# Patient Record
Sex: MALE | URBAN - METROPOLITAN AREA
[De-identification: names, ages, dates, MRNs, and addresses within clinical notes are randomized per-mention and may not be internally consistent; named-entity substitution may affect disease eponyms.]

---

## 2024-11-19 ENCOUNTER — EMERGENCY (EMERGENCY)
Facility: HOSPITAL | Age: 24
LOS: 1 days | Discharge: HOME | End: 2024-11-19
Attending: EMERGENCY MEDICINE | Admitting: EMERGENCY MEDICINE
Payer: SELF-PAY

## 2024-11-19 VITALS
DIASTOLIC BLOOD PRESSURE: 81 MMHG | RESPIRATION RATE: 16 BRPM | TEMPERATURE: 98 F | SYSTOLIC BLOOD PRESSURE: 175 MMHG | HEART RATE: 151 BPM | OXYGEN SATURATION: 99 %

## 2024-11-19 LAB
ALBUMIN SERPL ELPH-MCNC: 4.1 G/DL — SIGNIFICANT CHANGE UP (ref 3.4–5)
ALP SERPL-CCNC: 80 U/L — SIGNIFICANT CHANGE UP (ref 40–120)
ALT FLD-CCNC: 16 U/L — SIGNIFICANT CHANGE UP (ref 12–42)
ANION GAP SERPL CALC-SCNC: 10 MMOL/L — SIGNIFICANT CHANGE UP (ref 9–16)
AST SERPL-CCNC: 21 U/L — SIGNIFICANT CHANGE UP (ref 15–37)
BILIRUB SERPL-MCNC: 0.5 MG/DL — SIGNIFICANT CHANGE UP (ref 0.2–1.2)
BUN SERPL-MCNC: 11 MG/DL — SIGNIFICANT CHANGE UP (ref 7–23)
CALCIUM SERPL-MCNC: 9.2 MG/DL — SIGNIFICANT CHANGE UP (ref 8.5–10.5)
CHLORIDE SERPL-SCNC: 104 MMOL/L — SIGNIFICANT CHANGE UP (ref 96–108)
CO2 SERPL-SCNC: 26 MMOL/L — SIGNIFICANT CHANGE UP (ref 22–31)
CREAT SERPL-MCNC: 1.21 MG/DL — SIGNIFICANT CHANGE UP (ref 0.5–1.3)
EGFR: 85 ML/MIN/1.73M2 — SIGNIFICANT CHANGE UP
GLUCOSE SERPL-MCNC: 209 MG/DL — HIGH (ref 70–99)
HCT VFR BLD CALC: 44.5 % — SIGNIFICANT CHANGE UP (ref 39–50)
HGB BLD-MCNC: 15.4 G/DL — SIGNIFICANT CHANGE UP (ref 13–17)
MCHC RBC-ENTMCNC: 31.8 PG — SIGNIFICANT CHANGE UP (ref 27–34)
MCHC RBC-ENTMCNC: 34.6 G/DL — SIGNIFICANT CHANGE UP (ref 32–36)
MCV RBC AUTO: 91.9 FL — SIGNIFICANT CHANGE UP (ref 80–100)
NRBC # BLD: 0 /100 WBCS — SIGNIFICANT CHANGE UP (ref 0–0)
PLATELET # BLD AUTO: 211 K/UL — SIGNIFICANT CHANGE UP (ref 150–400)
POTASSIUM SERPL-MCNC: 3.4 MMOL/L — LOW (ref 3.5–5.3)
POTASSIUM SERPL-SCNC: 3.4 MMOL/L — LOW (ref 3.5–5.3)
PROT SERPL-MCNC: 7.8 G/DL — SIGNIFICANT CHANGE UP (ref 6.4–8.2)
RBC # BLD: 4.84 M/UL — SIGNIFICANT CHANGE UP (ref 4.2–5.8)
RBC # FLD: 12 % — SIGNIFICANT CHANGE UP (ref 10.3–14.5)
SODIUM SERPL-SCNC: 140 MMOL/L — SIGNIFICANT CHANGE UP (ref 132–145)
WBC # BLD: 10.41 K/UL — SIGNIFICANT CHANGE UP (ref 3.8–10.5)
WBC # FLD AUTO: 10.41 K/UL — SIGNIFICANT CHANGE UP (ref 3.8–10.5)

## 2024-11-19 PROCEDURE — 99291 CRITICAL CARE FIRST HOUR: CPT

## 2024-11-19 PROCEDURE — 99053 MED SERV 10PM-8AM 24 HR FAC: CPT

## 2024-11-19 RX ORDER — LORAZEPAM 2 MG
1 TABLET ORAL ONCE
Refills: 0 | Status: DISCONTINUED | OUTPATIENT
Start: 2024-11-19 | End: 2024-11-19

## 2024-11-19 RX ORDER — SODIUM CHLORIDE 9 MG/ML
1000 INJECTION, SOLUTION INTRAMUSCULAR; INTRAVENOUS; SUBCUTANEOUS ONCE
Refills: 0 | Status: COMPLETED | OUTPATIENT
Start: 2024-11-19 | End: 2024-11-19

## 2024-11-19 RX ADMIN — Medication 1 MILLIGRAM(S): at 23:22

## 2024-11-19 RX ADMIN — SODIUM CHLORIDE 1000 MILLILITER(S): 9 INJECTION, SOLUTION INTRAMUSCULAR; INTRAVENOUS; SUBCUTANEOUS at 23:20

## 2024-11-19 NOTE — ED PROVIDER NOTE - PHYSICAL EXAMINATION
Constitutional:  Anxious, appears under the influence  HEENT: Airway patent, Nasal mucosa clear. Dry mucus membranes  Eyes: Injected, pupils are dilated  Cardiac: Normal rate, regular rhythm.  Respiratory: Breath sounds clear and equal bilaterally.  GI: Abdomen soft, non-tender, no guarding.  MSK: Spine appears normal, range of motion is not limited, no muscle or joint tenderness  Neuro: Alert and oriented, no focal deficits, no motor or sensory deficits.  Skin: Skin normal color for race, warm, dry and intact. No evidence of rash.

## 2024-11-19 NOTE — ED ADULT NURSE NOTE - OBJECTIVE STATEMENT
Patient states took unknown pill and now feels face is deformed. Pt is awake and alert. Tachycardiac on the monitor. no acute distress at present time. pt speaking with family on the phone.

## 2024-11-19 NOTE — ED PROVIDER NOTE - MDM ORDERS SUBMITTED SELECTION
An ETCO2 monitor was placed on the pt with 3LPM bled in. The Ambu bag, suction, and airways were setup and present in the room, but not needed. Pt was able to maintain airway throughout the procedure with no intervention needed. ETCO2 levels were maintained between 37-33    Mary Grace Rust, RT        Medications

## 2024-11-19 NOTE — ED ADULT TRIAGE NOTE - CHIEF COMPLAINT QUOTE
Pt BIBA c/o AMS. Pt states took an unknown pink pill from someone and began feeling "my face is deforming" and increase anxiety. Noted to be tachycardic in triage. Cryotherapy Text: The wound bed was treated with cryotherapy after the biopsy was performed.

## 2024-11-19 NOTE — ED ADULT NURSE NOTE - CHIEF COMPLAINT QUOTE
Pt BIBA c/o AMS. Pt states took an unknown pink pill from someone and began feeling "my face is deforming" and increase anxiety. Noted to be tachycardic in triage. Cephalexin Counseling: I counseled the patient regarding use of cephalexin as an antibiotic for prophylactic and/or therapeutic purposes. Cephalexin (commonly prescribed under brand name Keflex) is a cephalosporin antibiotic which is active against numerous classes of bacteria, including most skin bacteria. Side effects may include nausea, diarrhea, gastrointestinal upset, rash, hives, yeast infections, and in rare cases, hepatitis, kidney disease, seizures, fever, confusion, neurologic symptoms, and others. Patients with severe allergies to penicillin medications are cautioned that there is about a 10% incidence of cross-reactivity with cephalosporins. When possible, patients with penicillin allergies should use alternatives to cephalosporins for antibiotic therapy.

## 2024-11-19 NOTE — ED PROVIDER NOTE - OBJECTIVE STATEMENT
24-year-old male with no past medical history presenting to the emergency room with side effects of drug abuse.  Patient states that he was forced to take a pill that was pink approximate 30 minutes to an hour prior to arrival, started to feel like something was crawling on his face, states that he is feeling full body numbness as well.  Denies chest pain or shortness of breath.  Denies taking any other drugs or alcohol.  Denies any medical history

## 2024-11-19 NOTE — ED PROVIDER NOTE - CLINICAL SUMMARY MEDICAL DECISION MAKING FREE TEXT BOX
A/P: 24-year-old male with no past medical history presenting to the emergency room with side effects of drug abuse.  Patient states that he was forced to take a pill that was pink approximate 30 minutes to an hour prior to arrival, started to feel like something was crawling on his face, states that he is feeling full body numbness as well.  Denies chest pain or shortness of breath.  Denies taking any other drugs or alcohol.  Denies any medical history  --Patient is experiencing side effects of is likely experiencing the side effects of amphetamines.  -- Plan to check lab work to rule out electrolyte imbalance, EKG to rule out arrhythmia, and will treat with fluids, Ativan and continue to monitor

## 2024-11-19 NOTE — ED PROVIDER NOTE - CRITICAL CARE ATTENDING CONTRIBUTION TO CARE
The patient was seen immediately upon arrival due to a high probability of imminent or life-threatening deterioration secondary to drug abuse with extreme tachycardia, which required my direct attention, intervention, and personal management at the bedside. I have personally provided critical care time exclusive of time spent on separately billable procedures. Time includes review of laboratory data, radiology results, discussion with consultants, discussion with the patient's family, and monitoring for potential decompensation.

## 2024-11-20 VITALS
DIASTOLIC BLOOD PRESSURE: 67 MMHG | RESPIRATION RATE: 18 BRPM | OXYGEN SATURATION: 98 % | HEART RATE: 89 BPM | SYSTOLIC BLOOD PRESSURE: 132 MMHG

## 2024-11-20 LAB — PCP SPEC-MCNC: SIGNIFICANT CHANGE UP

## 2024-11-20 PROCEDURE — 99238 HOSP IP/OBS DSCHRG MGMT 30/<: CPT

## 2024-11-20 RX ORDER — SODIUM CHLORIDE 9 MG/ML
1000 INJECTION, SOLUTION INTRAMUSCULAR; INTRAVENOUS; SUBCUTANEOUS ONCE
Refills: 0 | Status: COMPLETED | OUTPATIENT
Start: 2024-11-20 | End: 2024-11-20

## 2024-11-20 RX ADMIN — SODIUM CHLORIDE 1000 MILLILITER(S): 9 INJECTION, SOLUTION INTRAMUSCULAR; INTRAVENOUS; SUBCUTANEOUS at 00:18

## 2024-11-20 RX ADMIN — SODIUM CHLORIDE 1000 MILLILITER(S): 9 INJECTION, SOLUTION INTRAMUSCULAR; INTRAVENOUS; SUBCUTANEOUS at 00:33

## 2024-11-20 NOTE — ED CDU PROVIDER DISPOSITION NOTE - NSFOLLOWUPINSTRUCTIONS_ED_ALL_ED_FT
Methamphetamine Tablets  ¿Qué es elysia medicamento?  La METANFETAMINA se usa para tratar el trastorno del déficit de atención con hiperactividad (TDAH). Actúa mejorando la concentración y reduciendo el comportamiento impulsivo. Pertenece a un herbert de medicamentos llamados estimulantes.    Elysia medicamento puede ser utilizado para otros usos; si tiene alguna pregunta consulte con dwyer proveedor de atención médica o con dwyer farmacéutico.    MARCAS COMUNES: Desoxyn    ¿Qué le farhana informar a mi profesional de la mars antes de tammy elysia medicamento?  Necesitan saber si usted presenta alguno de los siguientes problemas o situaciones:    Ansiedad o ataques de pánico  Problemas de circulación en los dedos de las irma y de los pies (enfermedad de Raynaud)  Glaucoma  Endurecimiento o bloqueos de las arterias o los vasos sanguíneos del corazón  Enfermedad cardiaca o un defecto del corazón  Presión arterial sean  Antecedentes de accidente cerebrovascular  Antecedentes de trastorno de uso de sustancias  Enfermedad renal  Enfermedad hepática  Afección de mars mental  Convulsiones  Ideas, planes o intento de suicidio por parte suya o de alguien de dwyer elzbieta  Enfermedad tiroidea  Síndrome de Tourette  Ananth reacción alérgica o inusual a la metanfetamina, a otros medicamentos, alimentos, colorantes o conservantes  Si está embarazada o buscando quedar embarazada  Si está amamantando a un bebé  ¿Cómo farhana utilizar elysia medicamento?  [place] elysia medicamento por vía oral con un vaso de agua. Siga las instrucciones en la etiqueta del medicamento. Use dwyer medicamento a intervalos regulares. No lo use con ananth frecuencia mayor a la indicada. No deje de usarlo, excepto si así lo indica dwyer equipo de atención.    Dwyer farmacéutico le dará ananth Guía del medicamento especial (MedGuide, nombre en inglés) con cada receta y en cada ocasión que la vuelva a surtir. Asegúrese de leer esta información cada vez cuidadosamente.    Hable con dwyer equipo de atención sobre el uso de elysia medicamento en niños. Aunque elysia medicamento se puede recetar a niños tan pequeños wolf de 6 años de edad con ciertas afecciones, existen precauciones que deben tomarse.    Los pacientes de más de 65 años de edad pueden presentar reacciones más papi y necesitar dosis menores.    Sobredosis: Póngase en contacto inmediatamente con un centro toxicológico o ananth rosa maria de urgencia si usted curtis que haya tomado demasiado medicamento.<br>ATENCIÓN: Elysia medicamento es solo para usted. No comparta elysia medicamento con nadie.    ¿Qué sucede si me olvido de ananth dosis?  Si olvida ananth dosis, adminístrela lo antes posible. Si es trena la hora de la próxima dosis, administre solo ildefonso dosis. No se administre dosis adicionales o dobles.    ¿Qué puede interactuar con elysia medicamento?  No use elysia medicamento con ninguno de los siguientes productos:    Linezolida  IMAO, tales wolf [place], [place] y [place]  Janice de metileno  Elysia medicamento también podría interactuar con los siguientes productos:    Acetazolamida  Alcohol  Ácido ascórbico  Ciertos medicamentos para la depresión, ansiedad u otras afecciones de mars mental  Ciertos medicamentos para la diabetes  Ciertos medicamentos para la migraña, wolf sumatriptán  Guanetidina  Opioides  Reserpina  Bicarbonato de sodio  Hierba de [place]  Diuréticos tiazídicos, wolf clorotiazida  Triptófano  Puede ser que esta lista no menciona todas las posibles interacciones. Informe a dwyer profesional de la mars de todos los productos a base de hierbas, medicamentos de venta madisyn o suplementos nutritivos que esté tomando. Si usted fuma, consume bebidas alcohólicas o si utiliza drogas ilegales, indíqueselo también a dwyer profesional de la mars. Algunas sustancias pueden interactuar con dwyer medicamento.    ¿A qué farhana estar atento al usar elysia medicamento?  Visite a dwyer equipo de atención para que revise dwyer evolución periódicamente. Informe a dwyer equipo de atención si los síntomas no comienzan a mejorar o si empeoran.    Elysia medicamento requiere ananth nueva receta de dwyer equipo de atención cada vez que se surte en la farmacia.    Se puede abusar de elysia medicamento y hacer que dwyer cerebro y dwyer cuerpo dependan de él después de dosis altas o un uso prolongado. Dwyer equipo de atención evaluará dwyer riesgo y lo monitoreará estrechamente nasir el tratamiento.    El uso prolongado de elysia medicamento podría causar dependencia para dwyer cerebro y dwyer cuerpo. Es posible que pueda tammy descansos de elysia medicamento nasir los fines de semana, días festivos o vacaciones de verano. Hable con dwyer equipo de atención para determinar qué funciona para usted. Si dwyer equipo de atención desea que deje de usar el medicamento permanentemente, la dosis se podría ir disminuyendo lentamente nasir un tiempo para reducir el riesgo de tener efectos secundarios.    Informe a dwyer equipo de atención si elysia medicamento ronnie de actuar o si siente la necesidad de usar ananth cantidad mayor que la recetada. No cambie dwyer dosis sin antes consultar a dwyer equipo de atención.    No administre elysia medicamento cerca de la hora de acostarse. Podría impedirle dormir.    La pérdida de apetito es frecuente al comenzar a usar elysia medicamento. Para combatir elysia problema, coma pequeñas cantidades de comida o refrigerios con frecuencia. Hable con dwyer equipo de atención si la pérdida de apetito persiste. Se deberá revisar frecuentemente el peso y el crecimiento de los niños mientras usan elysia medicamento.    Informe a dwyer equipo de atención inmediatamente si nota heridas inexplicables en rusty dedos de las irma o de los pies mientras esté usando elysia medicamento. También debe informar a dwyer equipo de atención si experimenta entumecimiento o dolor, cambios en el color de la piel o sensibilidad a la temperatura en los dedos de las irma o de los pies.    Si tiene ananth erección que dura más de 4 horas o si se vuelve dolorosa, comuníquese con dwyer equipo de atención inmediatamente. [place] puede ser ananth señal de un problema dina y debe tratarse inmediatamente para prevenir daño permanente.    ¿Qué efectos secundarios puedo tener al utilizar elysia medicamento?  Efectos secundarios que debe informar a dwyer equipo de atención tan pronto wolf sea posible:    Reacciones alérgicas: erupción cutánea, comezón/picazón, urticaria, hinchazón de la tyrell, los labios, la lengua o la garganta  Ataque cardiaco: dolor u opresión en el pecho, los hombros, los brazos o la mandíbula, náuseas, falta de aire, piel fría o sudorosa, sensación de desmayo o aturdimiento  Cambios en el ritmo cardiaco: frecuencia cardiaca rápida o irregular, mareos, sensación de desmayo o aturdimiento, dolor en el pecho, dificultad para respirar  Aumento de la presión arterial  Irritabilidad, confusión, frecuencia cardiaca rápida o irregular, rigidez muscular, tics musculares, sudoración, fiebre sean, convulsiones, escalofríos, vómito, diarrea, que pueden ser signos del síndrome serotoninérgico  Cambios en el estado de ánimo y el comportamiento: ansiedad, nerviosismo, confusión, alucinaciones, irritabilidad, hostilidad, ideas suicidas o de autolesionarse, empeoramiento del estado de ánimo, sentimientos de depresión  Erección prolongada o dolorosa  Síndrome de Raynaud: dedos de las irma o los pies fríos, entumecidos o dolorosos que pueden cambiar de color pálido, a janice, a dempsey  Convulsiones  Accidente cerebrovascular: entumecimiento o debilidad repentinos de la tyrell, un brazo o ananth pierna, dificultad para hablar, confusión, dificultad para caminar, pérdida de equilibrio o coordinación, mareos, dolor de zbigniew intenso, cambio en la visión  Efectos secundarios que generalmente no requieren atención médica (informe a dwyer equipo de atención si persisten o si son molestos):    [place]  Dolor de zbigniew  Pérdida de apetito y de peso  Náuseas  Dolor estomacal  Dificultad para dormir  Puede ser que esta lista no menciona todos los posibles efectos secundarios. Comuníquese a dwyer médico por asesoramiento médico sobre los efectos secundarios. Usted puede informar los efectos secundarios a la FDA por teléfono al [phone].    ¿Dónde farhana guardar mi medicina?  Mantenga fuera del alcance de niños y mascotas. Existe la posibilidad de abusar de elysia medicamento. Manténgalo en un lugar seguro para protegerlo contra robos. No lo comparta con nadie. Es solamente para usted. Es peligroso  o regalar elysia medicamento, y está prohibido por la meg.    Guarde a temperatura inferior a 30 grados Celsius (86 grados Fahrenheit). Proteja margie. Deseche todo el medicamento que no haya utilizado después de la fecha de vencimiento.    Elysia medicamento puede causar daños y la muerte si lo usan otros adultos, niños o mascotas. Es importante desechar el medicamento tan pronto deje de necesitarlo o haya vencido. Puede hacerlo de dos formas:    Lleve el medicamento a un programa de recuperación de medicamentos. Consulte con dwyer farmacia o con ananth entidad reguladora para encontrar un lugar donde llevarlo.  Si no puede devolver el medicamento, consulte la etiqueta o el folleto de información para suze si debe desecharlo en la basura o arrojarlo por el sanitario. Si no está seguro, pregunte a dwyer equipo de atención. Si es seguro colocarlo en la basura, saque el medicamento del recipiente. Mezcle el medicamento con piedras sanitarias para gatos, meghann, posos (residuos) de café u otro desperdicio. Coloque la mezcla en ananth bolsa o recipiente que quede giselle cerrado. Deseche en la basura.  <b>ATENCIÓN: Elysia folleto es un resumen. Puede ser que no cubra toda la posible información. Si usted tiene preguntas acerca de esta medicina, consulte con dwyer médico, dwyer farmacéutico o dwyer profesional de la mars.</b>    © 2024 [employer] (2024-03-28 00:00:00)

## 2024-11-20 NOTE — ED CDU PROVIDER DISPOSITION NOTE - CLINICAL COURSE
Patient was watched overnight, heart rate improved, symptoms improved, at time of discharge, is awake, alert, clinically sober and stable for discharge.

## 2024-11-20 NOTE — ED ADULT NURSE REASSESSMENT NOTE - NS ED NURSE REASSESS COMMENT FT1
Patient provided with food and homeless shelter lists. Pt given instructions using . Offered patient metrocard, pt declined.
even/unlabored respirations on RA, pending metabolization

## 2024-11-20 NOTE — ED CDU PROVIDER DISPOSITION NOTE - PATIENT PORTAL LINK FT
You can access the FollowMyHealth Patient Portal offered by [facility] by registering at the following website: [email or web address]. By joining [employer]’s FollowMyHealth portal, you will also be able to view your health information using other applications (apps) compatible with our system.

## 2024-11-21 DIAGNOSIS — R20.0 ANESTHESIA OF SKIN: ICD-10-CM

## 2024-11-21 DIAGNOSIS — R00.0 TACHYCARDIA, UNSPECIFIED: ICD-10-CM

## 2024-11-21 DIAGNOSIS — F15.10 OTHER STIMULANT ABUSE, UNCOMPLICATED: ICD-10-CM
